# Patient Record
Sex: MALE | Race: WHITE | URBAN - METROPOLITAN AREA
[De-identification: names, ages, dates, MRNs, and addresses within clinical notes are randomized per-mention and may not be internally consistent; named-entity substitution may affect disease eponyms.]

---

## 2021-03-03 ENCOUNTER — IMMUNIZATION (OUTPATIENT)
Dept: PRIMARY CARE CLINIC | Age: 66
End: 2021-03-03
Payer: MEDICARE

## 2021-03-03 PROCEDURE — 0001A COVID-19, PFIZER VACCINE 30MCG/0.3ML DOSE: CPT | Performed by: FAMILY MEDICINE

## 2021-03-03 PROCEDURE — 91300 COVID-19, PFIZER VACCINE 30MCG/0.3ML DOSE: CPT | Performed by: FAMILY MEDICINE

## 2021-03-24 ENCOUNTER — IMMUNIZATION (OUTPATIENT)
Dept: PRIMARY CARE CLINIC | Age: 66
End: 2021-03-24
Payer: MEDICARE

## 2021-03-24 PROCEDURE — 0002A COVID-19, PFIZER VACCINE 30MCG/0.3ML DOSE: CPT | Performed by: FAMILY MEDICINE

## 2021-03-24 PROCEDURE — 91300 COVID-19, PFIZER VACCINE 30MCG/0.3ML DOSE: CPT | Performed by: FAMILY MEDICINE

## 2024-04-01 ENCOUNTER — HOSPITAL ENCOUNTER (OUTPATIENT)
Dept: CARDIAC REHAB | Age: 69
Setting detail: THERAPIES SERIES
Discharge: HOME | End: 2024-04-01
Payer: MEDICARE

## 2024-04-01 VITALS — HEIGHT: 74 IN | OXYGEN SATURATION: 98 % | WEIGHT: 231 LBS | BODY MASS INDEX: 29.65 KG/M2

## 2024-04-01 PROBLEM — E83.42 HYPOMAGNESEMIA: Status: RESOLVED | Noted: 2024-03-29 | Resolved: 2024-04-01

## 2024-04-01 PROBLEM — I10 HTN (HYPERTENSION): Status: RESOLVED | Noted: 2018-01-31 | Resolved: 2024-04-01

## 2024-04-01 PROBLEM — G60.9 IDIOPATHIC PERIPHERAL NEUROPATHY: Status: RESOLVED | Noted: 2018-11-01 | Resolved: 2024-04-01

## 2024-04-01 PROBLEM — I25.10 CORONARY ARTERY DISEASE INVOLVING NATIVE CORONARY ARTERY: Status: RESOLVED | Noted: 2024-02-26 | Resolved: 2024-04-01

## 2024-04-01 PROBLEM — Z95.1 S/P CABG X 4: Status: RESOLVED | Noted: 2024-03-12 | Resolved: 2024-04-01

## 2024-04-01 PROBLEM — E78.2 MIXED HYPERLIPIDEMIA: Status: RESOLVED | Noted: 2024-03-29 | Resolved: 2024-04-01

## 2024-04-01 PROBLEM — I95.81 POSTOPERATIVE HYPOTENSION: Status: RESOLVED | Noted: 2024-03-12 | Resolved: 2024-04-01

## 2024-04-01 PROBLEM — R94.39 ABNORMAL CARDIOVASCULAR STRESS TEST: Status: RESOLVED | Noted: 2024-02-05 | Resolved: 2024-04-01

## 2024-04-01 PROBLEM — K22.70 BARRETT'S ESOPHAGUS WITHOUT DYSPLASIA: Status: RESOLVED | Noted: 2023-12-27 | Resolved: 2024-04-01

## 2024-04-01 PROBLEM — I47.29 NSVT (NONSUSTAINED VENTRICULAR TACHYCARDIA) (HCC): Status: RESOLVED | Noted: 2023-12-27 | Resolved: 2024-04-01

## 2024-04-01 PROBLEM — G45.9 TIA (TRANSIENT ISCHEMIC ATTACK): Status: RESOLVED | Noted: 2018-01-31 | Resolved: 2024-04-01

## 2024-04-01 PROBLEM — E53.8 VITAMIN B12 DEFICIENCY: Status: RESOLVED | Noted: 2018-11-01 | Resolved: 2024-04-01

## 2024-04-01 PROBLEM — C44.329 SQUAMOUS CELL CARCINOMA OF FOREHEAD: Status: RESOLVED | Noted: 2019-09-18 | Resolved: 2024-04-01

## 2024-04-01 PROCEDURE — 93798 PHYS/QHP OP CAR RHAB W/ECG: CPT

## 2024-04-01 RX ORDER — AMIODARONE HYDROCHLORIDE 200 MG/1
200 TABLET ORAL SEE ADMIN INSTRUCTIONS
COMMUNITY
Start: 2024-03-17 | End: 2024-04-13

## 2024-04-01 RX ORDER — ASPIRIN 325 MG
325 TABLET, DELAYED RELEASE (ENTERIC COATED) ORAL DAILY
COMMUNITY
Start: 2024-03-18

## 2024-04-01 RX ORDER — ATORVASTATIN CALCIUM 80 MG/1
80 TABLET, FILM COATED ORAL DAILY
COMMUNITY
Start: 2024-02-22 | End: 2025-02-16

## 2024-04-01 RX ORDER — PANTOPRAZOLE SODIUM 40 MG/1
40 TABLET, DELAYED RELEASE ORAL DAILY
COMMUNITY
Start: 2023-12-27

## 2024-04-01 ASSESSMENT — PATIENT HEALTH QUESTIONNAIRE - PHQ9
SUM OF ALL RESPONSES TO PHQ QUESTIONS 1-9: 8
8. MOVING OR SPEAKING SO SLOWLY THAT OTHER PEOPLE COULD HAVE NOTICED. OR THE OPPOSITE, BEING SO FIGETY OR RESTLESS THAT YOU HAVE BEEN MOVING AROUND A LOT MORE THAN USUAL: SEVERAL DAYS
9. THOUGHTS THAT YOU WOULD BE BETTER OFF DEAD, OR OF HURTING YOURSELF: NOT AT ALL
4. FEELING TIRED OR HAVING LITTLE ENERGY: MORE THAN HALF THE DAYS
6. FEELING BAD ABOUT YOURSELF - OR THAT YOU ARE A FAILURE OR HAVE LET YOURSELF OR YOUR FAMILY DOWN: NOT AT ALL
5. POOR APPETITE OR OVEREATING: MORE THAN HALF THE DAYS
SUM OF ALL RESPONSES TO PHQ QUESTIONS 1-9: 8
SUM OF ALL RESPONSES TO PHQ QUESTIONS 1-9: 8
1. LITTLE INTEREST OR PLEASURE IN DOING THINGS: SEVERAL DAYS
3. TROUBLE FALLING OR STAYING ASLEEP: MORE THAN HALF THE DAYS
SUM OF ALL RESPONSES TO PHQ9 QUESTIONS 1 & 2: 1
2. FEELING DOWN, DEPRESSED OR HOPELESS: NOT AT ALL
7. TROUBLE CONCENTRATING ON THINGS, SUCH AS READING THE NEWSPAPER OR WATCHING TELEVISION: NOT AT ALL
SUM OF ALL RESPONSES TO PHQ QUESTIONS 1-9: 8

## 2024-04-01 ASSESSMENT — EXERCISE STRESS TEST
PEAK_HR: 63
PEAK_RPE: 7
PEAK_BP: 156/74
PEAK_RPD: 0

## 2024-04-01 ASSESSMENT — LIFESTYLE VARIABLES: SMOKELESS_TOBACCO: NO

## 2024-04-01 ASSESSMENT — EJECTION FRACTION: EF_VALUE: 60

## 2024-04-01 NOTE — PLAN OF CARE
Cardiopulmonary Rehab Treatment Plan   Name: Rojas Up Assessment Date: 2024   : 1955 Primary Diagnosis: Treatment Diagnosis 1: CABG (x4)      Age: 68 y.o. Referring Physician:  Remedios Yi   MRN: [id number] Primary Care Physician: No primary care provider on file.     Mr. Up is engaged and active in setting goals to begin cardiac rehab. He is self motivated, reporting walking 1.25 miles several times a day. Complains his sleep cycle is disrupted and expresses concern about having energy to exercise in early classes if he doesn't sleep, but pt hopeful new rhythms and exercise will help his body settle in to a sleep rhythm.    Treatment Diagnosis  Treatment Diagnosis 1: CABG (x4)  CABG Date: 24  Referral Date: 24  Significant Cardiovascular History: Chronic atrial fibrillation    Individual Treatment Plan  ITP Visit Type: Initial assessment  Visit #/Total Visits:   EF%: 60 %  Risk Stratification: Low  ITP: Exercise; Psychosocial; Nutrition; Education    Exercise   Stages of Change: Preparation  [place] Total Score: 0  Test: Six minute walk test    Data Measured Before Walk  Heart Rate: 57  Blood Pressure: 118/62 (sitting)  O2 Saturation: 98  O2 Device: Room air  RPD: 0  RPE: 0    Data Measured during Walk  Indicate Mode of RPE: 6 minutes/submax  RPE Data Measured: Post  Any problems while exercising: denies  Do You Have Shortness of Breath: No  Peak RPE: 7  Peak RPD: 0    Data Measured Immediately After Walk  Distance Walked in : ft  Distance Walked (ft): 1380 ft  Peak Heart Rate: 63  Peak Blood Pressure: 156/74  RPE: 7  O2 Saturation: 98    Data Measured at 5 Minutes After Walk  Heart Rate: 59  Blood Pressure: 122/68  SpO2: 96 %  O2 Device: Room air    Exercise Prescription  Mode: Track; Treadmill; Bike; Stepper; Ergometer; Elliptical; Rower  Frequency per week: 2-3 supervised sessions weekly  Duration Per Session: 20-36+ minutes of steady aerobic exercise  Intensity METS

## 2024-04-03 ENCOUNTER — HOSPITAL ENCOUNTER (OUTPATIENT)
Dept: CARDIAC REHAB | Age: 69
Setting detail: THERAPIES SERIES
Discharge: HOME | End: 2024-04-03
Payer: MEDICARE

## 2024-04-03 VITALS — BODY MASS INDEX: 29.84 KG/M2 | WEIGHT: 232.4 LBS

## 2024-04-03 PROCEDURE — 93798 PHYS/QHP OP CAR RHAB W/ECG: CPT

## 2024-04-05 ENCOUNTER — HOSPITAL ENCOUNTER (OUTPATIENT)
Dept: CARDIAC REHAB | Age: 69
Setting detail: THERAPIES SERIES
Discharge: HOME | End: 2024-04-05
Payer: MEDICARE

## 2024-04-05 VITALS — WEIGHT: 231 LBS | BODY MASS INDEX: 29.66 KG/M2

## 2024-04-05 PROCEDURE — 93798 PHYS/QHP OP CAR RHAB W/ECG: CPT

## 2024-04-08 ENCOUNTER — HOSPITAL ENCOUNTER (OUTPATIENT)
Dept: CARDIAC REHAB | Age: 69
Setting detail: THERAPIES SERIES
Discharge: HOME | End: 2024-04-08
Payer: MEDICARE

## 2024-04-08 VITALS — WEIGHT: 231 LBS | BODY MASS INDEX: 29.66 KG/M2

## 2024-04-08 PROCEDURE — 93798 PHYS/QHP OP CAR RHAB W/ECG: CPT

## 2024-04-10 ENCOUNTER — HOSPITAL ENCOUNTER (OUTPATIENT)
Dept: CARDIAC REHAB | Age: 69
Setting detail: THERAPIES SERIES
Discharge: HOME | End: 2024-04-10
Payer: MEDICARE

## 2024-04-10 VITALS — BODY MASS INDEX: 29.4 KG/M2 | WEIGHT: 229 LBS

## 2024-04-10 PROCEDURE — 93798 PHYS/QHP OP CAR RHAB W/ECG: CPT

## 2024-04-12 ENCOUNTER — HOSPITAL ENCOUNTER (OUTPATIENT)
Dept: CARDIAC REHAB | Age: 69
Setting detail: THERAPIES SERIES
Discharge: HOME | End: 2024-04-12
Payer: MEDICARE

## 2024-04-12 VITALS — WEIGHT: 233 LBS | BODY MASS INDEX: 29.92 KG/M2

## 2024-04-12 PROCEDURE — 93798 PHYS/QHP OP CAR RHAB W/ECG: CPT

## 2024-04-15 ENCOUNTER — HOSPITAL ENCOUNTER (OUTPATIENT)
Dept: CARDIAC REHAB | Age: 69
Setting detail: THERAPIES SERIES
Discharge: HOME | End: 2024-04-15
Payer: MEDICARE

## 2024-04-15 ENCOUNTER — APPOINTMENT (OUTPATIENT)
Dept: CARDIAC REHAB | Age: 69
End: 2024-04-15
Payer: MEDICARE

## 2024-04-15 VITALS — WEIGHT: 237 LBS | BODY MASS INDEX: 30.43 KG/M2

## 2024-04-15 PROCEDURE — 93798 PHYS/QHP OP CAR RHAB W/ECG: CPT

## 2024-04-17 ENCOUNTER — HOSPITAL ENCOUNTER (OUTPATIENT)
Dept: CARDIAC REHAB | Age: 69
Setting detail: THERAPIES SERIES
Discharge: HOME | End: 2024-04-17
Payer: MEDICARE

## 2024-04-17 VITALS — BODY MASS INDEX: 29.4 KG/M2 | WEIGHT: 229 LBS

## 2024-04-17 PROCEDURE — 93798 PHYS/QHP OP CAR RHAB W/ECG: CPT

## 2024-04-19 ENCOUNTER — HOSPITAL ENCOUNTER (OUTPATIENT)
Dept: CARDIAC REHAB | Age: 69
Setting detail: THERAPIES SERIES
Discharge: HOME | End: 2024-04-19
Payer: MEDICARE

## 2024-04-19 PROCEDURE — 93798 PHYS/QHP OP CAR RHAB W/ECG: CPT

## 2024-04-22 ENCOUNTER — HOSPITAL ENCOUNTER (OUTPATIENT)
Dept: CARDIAC REHAB | Age: 69
Setting detail: THERAPIES SERIES
Discharge: HOME | End: 2024-04-22
Payer: MEDICARE

## 2024-04-22 VITALS — BODY MASS INDEX: 29.53 KG/M2 | WEIGHT: 230 LBS

## 2024-04-22 PROCEDURE — 93798 PHYS/QHP OP CAR RHAB W/ECG: CPT

## 2024-04-24 ENCOUNTER — HOSPITAL ENCOUNTER (OUTPATIENT)
Dept: CARDIAC REHAB | Age: 69
Setting detail: THERAPIES SERIES
Discharge: HOME | End: 2024-04-24
Payer: MEDICARE

## 2024-04-24 VITALS — BODY MASS INDEX: 29.4 KG/M2 | WEIGHT: 229 LBS

## 2024-04-24 PROCEDURE — 93798 PHYS/QHP OP CAR RHAB W/ECG: CPT

## 2024-04-26 ENCOUNTER — HOSPITAL ENCOUNTER (OUTPATIENT)
Dept: CARDIAC REHAB | Age: 69
Setting detail: THERAPIES SERIES
Discharge: HOME | End: 2024-04-26
Payer: MEDICARE

## 2024-04-26 VITALS — WEIGHT: 228 LBS | BODY MASS INDEX: 29.27 KG/M2

## 2024-04-26 PROCEDURE — 93798 PHYS/QHP OP CAR RHAB W/ECG: CPT

## 2024-04-29 ENCOUNTER — HOSPITAL ENCOUNTER (OUTPATIENT)
Dept: CARDIAC REHAB | Age: 69
Setting detail: THERAPIES SERIES
Discharge: HOME | End: 2024-04-29
Payer: MEDICARE

## 2024-04-29 VITALS — BODY MASS INDEX: 29.02 KG/M2 | WEIGHT: 226 LBS

## 2024-04-29 PROCEDURE — 93798 PHYS/QHP OP CAR RHAB W/ECG: CPT

## 2024-04-29 NOTE — PLAN OF CARE
Cardiopulmonary Rehab Treatment Plan   Name: Rojas Up Assessment Date: 2024   : 1955 Primary Diagnosis: Treatment Diagnosis 1: CABG (x4)      Age: 68 y.o. Referring Physician:  Remedios Yi   MRN: [id number] Primary Care Physician: No primary care provider on file.   Treatment Diagnosis  Treatment Diagnosis 1: CABG (x4)  CABG Date: 24  Referral Date: 24  Significant Cardiovascular History: Chronic atrial fibrillation    Individual Treatment Plan  ITP Visit Type: Re-assessment  1st Date of Exercise : 24  ITP Next Review Date: 24  Visit #/Total Visits:   EF%: 60 %  Risk Stratification: Low  ITP: Exercise; Psychosocial; Nutrition; Education    Exercise   Stages of Change: Radha [place] Total Score: 0  Test: Six minute walk test    Exercise Prescription  Mode: Track; Treadmill; Bike; Stepper; Ergometer; Elliptical; Rower  Frequency per week: 2-3 supervised sessions weekly  Duration Per Session: 25-42+ minutes of steady aerobic exercise  Intensity METS      : 3-6 (Increase workloads 0.5-1.0 METS/weekly)  RPE: 11-14  Progression: 1-2 sets of 8-12 repetitions for ea. muscle group. 1-5 lbs  Resistance Training: Yes    Exercise Blood Pressures  Resting BP: 126/70    Exercise Activity at Home  Type: walking  Frequency: 1-2 times daily  Duration: 1.25 miles    Exercise Education  Education: Signs/symptoms to report; RPE scale; Physically active; Warm up/cool down; Equipment orientation; Self pulse    Psychosocial  Stages of Change: Action  [place] Total Score: 21  PHQ-9 Total Score: 8    Psychosocial Intervention  Currently Taking Psychotropic Meds: No  Medication Changes: No    Psychosocial Education  Education: Cardiac meds; Coping techniques; Environmental triggers; Impact self care behaviors on health    Psychosocial Target Goals  Target Goal(s): Demonstrates appropriate interaction with others; Maximizes coping skills    Tobacco  Tobacco Use: No  Quit: Greater

## 2024-05-01 ENCOUNTER — APPOINTMENT (OUTPATIENT)
Dept: CARDIAC REHAB | Age: 69
End: 2024-05-01
Payer: MEDICARE

## 2024-05-03 ENCOUNTER — HOSPITAL ENCOUNTER (OUTPATIENT)
Dept: CARDIAC REHAB | Age: 69
Setting detail: THERAPIES SERIES
Discharge: HOME | End: 2024-05-03
Payer: MEDICARE

## 2024-05-03 VITALS — BODY MASS INDEX: 29.15 KG/M2 | WEIGHT: 227 LBS

## 2024-05-03 PROCEDURE — 93798 PHYS/QHP OP CAR RHAB W/ECG: CPT

## 2024-05-06 ENCOUNTER — HOSPITAL ENCOUNTER (OUTPATIENT)
Dept: CARDIAC REHAB | Age: 69
Setting detail: THERAPIES SERIES
Discharge: HOME | End: 2024-05-06
Payer: MEDICARE

## 2024-05-06 VITALS — BODY MASS INDEX: 29.02 KG/M2 | WEIGHT: 226 LBS

## 2024-05-06 PROCEDURE — 93798 PHYS/QHP OP CAR RHAB W/ECG: CPT

## 2024-05-06 ASSESSMENT — PATIENT HEALTH QUESTIONNAIRE - PHQ9
10. IF YOU CHECKED OFF ANY PROBLEMS, HOW DIFFICULT HAVE THESE PROBLEMS MADE IT FOR YOU TO DO YOUR WORK, TAKE CARE OF THINGS AT HOME, OR GET ALONG WITH OTHER PEOPLE: NOT DIFFICULT AT ALL
5. POOR APPETITE OR OVEREATING: NOT AT ALL
SUM OF ALL RESPONSES TO PHQ9 QUESTIONS 1 & 2: 0
6. FEELING BAD ABOUT YOURSELF - OR THAT YOU ARE A FAILURE OR HAVE LET YOURSELF OR YOUR FAMILY DOWN: NOT AT ALL
4. FEELING TIRED OR HAVING LITTLE ENERGY: NOT AT ALL
1. LITTLE INTEREST OR PLEASURE IN DOING THINGS: NOT AT ALL
7. TROUBLE CONCENTRATING ON THINGS, SUCH AS READING THE NEWSPAPER OR WATCHING TELEVISION: NOT AT ALL
8. MOVING OR SPEAKING SO SLOWLY THAT OTHER PEOPLE COULD HAVE NOTICED. OR THE OPPOSITE, BEING SO FIGETY OR RESTLESS THAT YOU HAVE BEEN MOVING AROUND A LOT MORE THAN USUAL: NOT AT ALL
SUM OF ALL RESPONSES TO PHQ QUESTIONS 1-9: 0
9. THOUGHTS THAT YOU WOULD BE BETTER OFF DEAD, OR OF HURTING YOURSELF: NOT AT ALL
SUM OF ALL RESPONSES TO PHQ QUESTIONS 1-9: 0
2. FEELING DOWN, DEPRESSED OR HOPELESS: NOT AT ALL
3. TROUBLE FALLING OR STAYING ASLEEP: NOT AT ALL

## 2024-05-06 ASSESSMENT — EXERCISE STRESS TEST
PEAK_HR: 70
PEAK_RPD: 0
PEAK_BP: 156/80
PEAK_RPE: 15

## 2024-05-06 NOTE — PLAN OF CARE
Cardiopulmonary Rehab Treatment Plan   Name: Rojas Up Assessment Date: 2024   : 1955 Primary Diagnosis: Treatment Diagnosis 1: CABG      Age: 68 y.o. Referring Physician:  Remedios Yi   MRN: [id number] Primary Care Physician: No primary care provider on file.   Treatment Diagnosis  Treatment Diagnosis 1: CABG  CABG Date: 24  Referral Date: 24  Significant Cardiovascular History: Chronic atrial fibrillation    Individual Treatment Plan  ITP Visit Type: Discharge, completed program  1st Date of Exercise : 24  ITP Next Review Date: 24  Visit #/Total Visits: 15/36  EF%: 60 %  Risk Stratification: Low  ITP: Exercise; Psychosocial; Nutrition; Education    Exercise   Stages of Change: Action  [place] Total Score: 0  Test: Six minute walk test    Data Measured Before Walk  Heart Rate: 54  Blood Pressure: 120/70  O2 Saturation: 97  O2 Device: Room air  RPD: 0  RPE: 0    Data Measured during Walk  Indicate Mode of RPE: 6 minutes/submax  RPE Data Measured: Post  Any problems while exercising: denies  Do You Have Shortness of Breath: No  Peak RPE: 15  Peak RPD: 0    Data Measured Immediately After Walk  Distance Walked in : ft  Distance Walked (ft): 1526 ft  Peak Heart Rate: 70  Peak Blood Pressure: 156/80  RPE: 15  O2 Saturation: 95    Data Measured at 5 Minutes After Walk  Heart Rate: 58  Blood Pressure: 130/72  SpO2: 97 %  O2 Device: Room air    Exercise Prescription  Mode: Track; Treadmill; Bike; Stepper; Ergometer; Elliptical; Rower  Frequency per week: 2-3 supervised sessions weekly  Duration Per Session: 25-42+ minutes of steady aerobic exercise  Intensity METS      : 3-6 (Increase workloads 0.5-1.0 METS/weekly)  RPE: 11-14  Progression: 1-2 sets of 8-12 repetitions for ea. muscle group. 1-5 lbs  Resistance Training: Yes    Exercise Blood Pressures  Resting BP: 126/70    Exercise Activity at Home  Type: walking  Frequency: BID  Duration: 45-60 min  Resistance Training:

## 2024-05-08 ENCOUNTER — APPOINTMENT (OUTPATIENT)
Dept: CARDIAC REHAB | Age: 69
End: 2024-05-08
Payer: MEDICARE

## 2024-05-10 ENCOUNTER — APPOINTMENT (OUTPATIENT)
Dept: CARDIAC REHAB | Age: 69
End: 2024-05-10
Payer: MEDICARE

## 2024-05-13 ENCOUNTER — APPOINTMENT (OUTPATIENT)
Dept: CARDIAC REHAB | Age: 69
End: 2024-05-13
Payer: MEDICARE

## 2024-05-15 ENCOUNTER — APPOINTMENT (OUTPATIENT)
Dept: CARDIAC REHAB | Age: 69
End: 2024-05-15
Payer: MEDICARE

## 2024-05-17 ENCOUNTER — APPOINTMENT (OUTPATIENT)
Dept: CARDIAC REHAB | Age: 69
End: 2024-05-17
Payer: MEDICARE

## 2024-05-20 ENCOUNTER — APPOINTMENT (OUTPATIENT)
Dept: CARDIAC REHAB | Age: 69
End: 2024-05-20
Payer: MEDICARE

## 2024-05-22 ENCOUNTER — APPOINTMENT (OUTPATIENT)
Dept: CARDIAC REHAB | Age: 69
End: 2024-05-22
Payer: MEDICARE

## 2024-05-24 ENCOUNTER — APPOINTMENT (OUTPATIENT)
Dept: CARDIAC REHAB | Age: 69
End: 2024-05-24
Payer: MEDICARE

## 2024-05-27 ENCOUNTER — APPOINTMENT (OUTPATIENT)
Dept: CARDIAC REHAB | Age: 69
End: 2024-05-27
Payer: MEDICARE

## 2024-05-29 ENCOUNTER — APPOINTMENT (OUTPATIENT)
Dept: CARDIAC REHAB | Age: 69
End: 2024-05-29
Payer: MEDICARE

## 2024-05-31 ENCOUNTER — APPOINTMENT (OUTPATIENT)
Dept: CARDIAC REHAB | Age: 69
End: 2024-05-31
Payer: MEDICARE